# Patient Record
Sex: FEMALE | Race: WHITE | HISPANIC OR LATINO
[De-identification: names, ages, dates, MRNs, and addresses within clinical notes are randomized per-mention and may not be internally consistent; named-entity substitution may affect disease eponyms.]

---

## 2017-08-03 ENCOUNTER — APPOINTMENT (OUTPATIENT)
Dept: DERMATOLOGY | Facility: CLINIC | Age: 27
End: 2017-08-03
Payer: MEDICAID

## 2017-08-03 PROCEDURE — 99214 OFFICE O/P EST MOD 30 MIN: CPT

## 2018-05-26 ENCOUNTER — EMERGENCY (EMERGENCY)
Facility: HOSPITAL | Age: 28
LOS: 1 days | Discharge: HOME | End: 2018-05-26
Attending: EMERGENCY MEDICINE
Payer: MEDICAID

## 2018-05-26 VITALS
RESPIRATION RATE: 18 BRPM | WEIGHT: 138.01 LBS | SYSTOLIC BLOOD PRESSURE: 138 MMHG | TEMPERATURE: 98 F | DIASTOLIC BLOOD PRESSURE: 86 MMHG | OXYGEN SATURATION: 99 % | HEIGHT: 63 IN | HEART RATE: 76 BPM

## 2018-05-26 PROCEDURE — 99284 EMERGENCY DEPT VISIT MOD MDM: CPT | Mod: 25

## 2018-05-27 VITALS
OXYGEN SATURATION: 100 % | TEMPERATURE: 99 F | RESPIRATION RATE: 18 BRPM | SYSTOLIC BLOOD PRESSURE: 118 MMHG | HEART RATE: 68 BPM | DIASTOLIC BLOOD PRESSURE: 83 MMHG

## 2018-05-27 DIAGNOSIS — Z90.711 ACQUIRED ABSENCE OF UTERUS WITH REMAINING CERVICAL STUMP: Chronic | ICD-10-CM

## 2018-05-27 DIAGNOSIS — Z98.891 HISTORY OF UTERINE SCAR FROM PREVIOUS SURGERY: Chronic | ICD-10-CM

## 2018-05-27 LAB
ALBUMIN SERPL ELPH-MCNC: 4.4 G/DL — SIGNIFICANT CHANGE UP (ref 3.3–5.2)
ALP SERPL-CCNC: 53 U/L — SIGNIFICANT CHANGE UP (ref 40–120)
ALT FLD-CCNC: 14 U/L — SIGNIFICANT CHANGE UP
ANION GAP SERPL CALC-SCNC: 15 MMOL/L — SIGNIFICANT CHANGE UP (ref 5–17)
APPEARANCE UR: ABNORMAL
APTT BLD: 28.3 SEC — SIGNIFICANT CHANGE UP (ref 27.5–37.4)
AST SERPL-CCNC: 21 U/L — SIGNIFICANT CHANGE UP
BACTERIA # UR AUTO: ABNORMAL
BASOPHILS # BLD AUTO: 0 K/UL — SIGNIFICANT CHANGE UP (ref 0–0.2)
BASOPHILS NFR BLD AUTO: 0.2 % — SIGNIFICANT CHANGE UP (ref 0–2)
BILIRUB SERPL-MCNC: 0.3 MG/DL — LOW (ref 0.4–2)
BILIRUB UR-MCNC: NEGATIVE — SIGNIFICANT CHANGE UP
BUN SERPL-MCNC: 15 MG/DL — SIGNIFICANT CHANGE UP (ref 8–20)
CALCIUM SERPL-MCNC: 9.6 MG/DL — SIGNIFICANT CHANGE UP (ref 8.6–10.2)
CHLORIDE SERPL-SCNC: 103 MMOL/L — SIGNIFICANT CHANGE UP (ref 98–107)
CO2 SERPL-SCNC: 22 MMOL/L — SIGNIFICANT CHANGE UP (ref 22–29)
COLOR SPEC: YELLOW — SIGNIFICANT CHANGE UP
CREAT SERPL-MCNC: 0.9 MG/DL — SIGNIFICANT CHANGE UP (ref 0.5–1.3)
DIFF PNL FLD: NEGATIVE — SIGNIFICANT CHANGE UP
EOSINOPHIL # BLD AUTO: 0 K/UL — SIGNIFICANT CHANGE UP (ref 0–0.5)
EOSINOPHIL NFR BLD AUTO: 0.1 % — SIGNIFICANT CHANGE UP (ref 0–6)
EPI CELLS # UR: SIGNIFICANT CHANGE UP
GLUCOSE SERPL-MCNC: 133 MG/DL — HIGH (ref 70–115)
GLUCOSE UR QL: NEGATIVE MG/DL — SIGNIFICANT CHANGE UP
HCG UR QL: NEGATIVE — SIGNIFICANT CHANGE UP
HCT VFR BLD CALC: 42.5 % — SIGNIFICANT CHANGE UP (ref 37–47)
HGB BLD-MCNC: 13.9 G/DL — SIGNIFICANT CHANGE UP (ref 12–16)
INR BLD: 0.99 RATIO — SIGNIFICANT CHANGE UP (ref 0.88–1.16)
KETONES UR-MCNC: ABNORMAL
LEUKOCYTE ESTERASE UR-ACNC: ABNORMAL
LIDOCAIN IGE QN: 32 U/L — SIGNIFICANT CHANGE UP (ref 22–51)
LYMPHOCYTES # BLD AUTO: 1.4 K/UL — SIGNIFICANT CHANGE UP (ref 1–4.8)
LYMPHOCYTES # BLD AUTO: 10.4 % — LOW (ref 20–55)
MAGNESIUM SERPL-MCNC: 2 MG/DL — SIGNIFICANT CHANGE UP (ref 1.6–2.6)
MCHC RBC-ENTMCNC: 27.9 PG — SIGNIFICANT CHANGE UP (ref 27–31)
MCHC RBC-ENTMCNC: 32.7 G/DL — SIGNIFICANT CHANGE UP (ref 32–36)
MCV RBC AUTO: 85.2 FL — SIGNIFICANT CHANGE UP (ref 81–99)
MONOCYTES # BLD AUTO: 0.8 K/UL — SIGNIFICANT CHANGE UP (ref 0–0.8)
MONOCYTES NFR BLD AUTO: 6 % — SIGNIFICANT CHANGE UP (ref 3–10)
NEUTROPHILS # BLD AUTO: 10.8 K/UL — HIGH (ref 1.8–8)
NEUTROPHILS NFR BLD AUTO: 83.1 % — HIGH (ref 37–73)
NITRITE UR-MCNC: NEGATIVE — SIGNIFICANT CHANGE UP
PH UR: 7 — SIGNIFICANT CHANGE UP (ref 5–8)
PLATELET # BLD AUTO: 253 K/UL — SIGNIFICANT CHANGE UP (ref 150–400)
POTASSIUM SERPL-MCNC: 4 MMOL/L — SIGNIFICANT CHANGE UP (ref 3.5–5.3)
POTASSIUM SERPL-SCNC: 4 MMOL/L — SIGNIFICANT CHANGE UP (ref 3.5–5.3)
PROT SERPL-MCNC: 7.5 G/DL — SIGNIFICANT CHANGE UP (ref 6.6–8.7)
PROT UR-MCNC: 15 MG/DL
PROTHROM AB SERPL-ACNC: 10.9 SEC — SIGNIFICANT CHANGE UP (ref 9.8–12.7)
RBC # BLD: 4.99 M/UL — SIGNIFICANT CHANGE UP (ref 4.4–5.2)
RBC # FLD: 13.1 % — SIGNIFICANT CHANGE UP (ref 11–15.6)
RBC CASTS # UR COMP ASSIST: ABNORMAL /HPF (ref 0–4)
SODIUM SERPL-SCNC: 140 MMOL/L — SIGNIFICANT CHANGE UP (ref 135–145)
SP GR SPEC: 1.01 — SIGNIFICANT CHANGE UP (ref 1.01–1.02)
UROBILINOGEN FLD QL: 1 MG/DL
WBC # BLD: 12.9 K/UL — HIGH (ref 4.8–10.8)
WBC # FLD AUTO: 12.9 K/UL — HIGH (ref 4.8–10.8)
WBC UR QL: SIGNIFICANT CHANGE UP

## 2018-05-27 PROCEDURE — 76830 TRANSVAGINAL US NON-OB: CPT | Mod: 26,59

## 2018-05-27 PROCEDURE — 85730 THROMBOPLASTIN TIME PARTIAL: CPT

## 2018-05-27 PROCEDURE — 85027 COMPLETE CBC AUTOMATED: CPT

## 2018-05-27 PROCEDURE — 83690 ASSAY OF LIPASE: CPT

## 2018-05-27 PROCEDURE — 76856 US EXAM PELVIC COMPLETE: CPT

## 2018-05-27 PROCEDURE — 76856 US EXAM PELVIC COMPLETE: CPT | Mod: 26

## 2018-05-27 PROCEDURE — 96375 TX/PRO/DX INJ NEW DRUG ADDON: CPT

## 2018-05-27 PROCEDURE — 81025 URINE PREGNANCY TEST: CPT

## 2018-05-27 PROCEDURE — 99284 EMERGENCY DEPT VISIT MOD MDM: CPT | Mod: 25

## 2018-05-27 PROCEDURE — 74176 CT ABD & PELVIS W/O CONTRAST: CPT

## 2018-05-27 PROCEDURE — 80053 COMPREHEN METABOLIC PANEL: CPT

## 2018-05-27 PROCEDURE — 74176 CT ABD & PELVIS W/O CONTRAST: CPT | Mod: 26

## 2018-05-27 PROCEDURE — 96376 TX/PRO/DX INJ SAME DRUG ADON: CPT

## 2018-05-27 PROCEDURE — 83735 ASSAY OF MAGNESIUM: CPT

## 2018-05-27 PROCEDURE — 76830 TRANSVAGINAL US NON-OB: CPT

## 2018-05-27 PROCEDURE — 85610 PROTHROMBIN TIME: CPT

## 2018-05-27 PROCEDURE — 96374 THER/PROPH/DIAG INJ IV PUSH: CPT

## 2018-05-27 PROCEDURE — 81001 URINALYSIS AUTO W/SCOPE: CPT

## 2018-05-27 RX ORDER — KETOROLAC TROMETHAMINE 30 MG/ML
30 SYRINGE (ML) INJECTION ONCE
Qty: 0 | Refills: 0 | Status: DISCONTINUED | OUTPATIENT
Start: 2018-05-27 | End: 2018-05-27

## 2018-05-27 RX ORDER — KETOROLAC TROMETHAMINE 30 MG/ML
1 SYRINGE (ML) INJECTION
Qty: 20 | Refills: 0 | OUTPATIENT
Start: 2018-05-27 | End: 2018-05-31

## 2018-05-27 RX ORDER — TAMSULOSIN HYDROCHLORIDE 0.4 MG/1
0.4 CAPSULE ORAL ONCE
Qty: 0 | Refills: 0 | Status: COMPLETED | OUTPATIENT
Start: 2018-05-27 | End: 2018-05-27

## 2018-05-27 RX ORDER — SODIUM CHLORIDE 9 MG/ML
1000 INJECTION INTRAMUSCULAR; INTRAVENOUS; SUBCUTANEOUS
Qty: 0 | Refills: 0 | Status: COMPLETED | OUTPATIENT
Start: 2018-05-27 | End: 2018-05-27

## 2018-05-27 RX ORDER — TAMSULOSIN HYDROCHLORIDE 0.4 MG/1
1 CAPSULE ORAL
Qty: 15 | Refills: 0 | OUTPATIENT
Start: 2018-05-27 | End: 2018-06-10

## 2018-05-27 RX ORDER — ONDANSETRON 8 MG/1
4 TABLET, FILM COATED ORAL ONCE
Qty: 0 | Refills: 0 | Status: COMPLETED | OUTPATIENT
Start: 2018-05-27 | End: 2018-05-27

## 2018-05-27 RX ORDER — OXYCODONE HYDROCHLORIDE 5 MG/1
5 TABLET ORAL ONCE
Qty: 0 | Refills: 0 | Status: DISCONTINUED | OUTPATIENT
Start: 2018-05-27 | End: 2018-05-27

## 2018-05-27 RX ADMIN — SODIUM CHLORIDE 999 MILLILITER(S): 9 INJECTION INTRAMUSCULAR; INTRAVENOUS; SUBCUTANEOUS at 02:45

## 2018-05-27 RX ADMIN — ONDANSETRON 4 MILLIGRAM(S): 8 TABLET, FILM COATED ORAL at 02:46

## 2018-05-27 RX ADMIN — Medication 30 MILLIGRAM(S): at 08:56

## 2018-05-27 RX ADMIN — TAMSULOSIN HYDROCHLORIDE 0.4 MILLIGRAM(S): 0.4 CAPSULE ORAL at 08:56

## 2018-05-27 RX ADMIN — Medication 30 MILLIGRAM(S): at 02:46

## 2018-05-27 RX ADMIN — OXYCODONE HYDROCHLORIDE 5 MILLIGRAM(S): 5 TABLET ORAL at 03:40

## 2018-05-27 NOTE — ED PROVIDER NOTE - PROGRESS NOTE DETAILS
results reviewed, expaliend dx to patient  requesting for pain meds, ordered  signed out to am ed attending signed out Dr Benjamin  for evaluation atter pain meds; pt improved dc on toradol, percocet, and flomax; referral to Dr Sanchez to urology

## 2018-05-27 NOTE — ED PROVIDER NOTE - NS ED ROS FT
no weight change, no fever or chills  no recent travel, no recent abox, no sick contacts  no recent change in medications  no rash, no bruises  no visual changes no eye discharge  no cough cold or congestion,   no sob, no chest pain  no orthopnea, no pnd  +abd pain +nausea no v/d  no hematuria, no change in urinary habits  no joint pain, no deformity  no headache, no paresthesia   +Sexually active no STD/STI

## 2018-05-27 NOTE — ED PROVIDER NOTE - PHYSICAL EXAMINATION
Constitutional: uncomfortable  talking in short sentences due to pain   Head: NC/AT, no swelling  Eyes: EOMI, no swelling  Mouth: mm moist  Neck: supple, trachea is midline  Chest: Bilateral air entry, symmetrical chest expansion, no distress  Heart: S1 S2 distant  Abdomen: RLQ tenderness with localized guarding and rebound   Musc/Skel: extremities with no swelling, no deformity, no spine tenderness, distal pulses present  Neuro: A&Ox3, no focal deficits

## 2018-05-27 NOTE — ED PROVIDER NOTE - OBJECTIVE STATEMENT
27 y/o F pt with hx of ovarian cyst presents to ED c/o acute RLQ abdominal pain associated with nausea. Pain described as a sharp sensation which worsening with movement. Pt is sexually active, no hx of STD/STI. No vomiting, diarrhea, fevers. No other complaints at this time.

## 2018-05-27 NOTE — ED ADULT NURSE NOTE - CHPI ED SYMPTOMS NEG
no burning urination/no vomiting/no abdominal distension/no chills/no blood in stool/no diarrhea/no dysuria/no fever/no hematuria

## 2018-05-27 NOTE — ED ADULT NURSE REASSESSMENT NOTE - NS ED NURSE REASSESS COMMENT FT1
pt s/p reevaluation cleared to be discharged home. pt instructed to follow up with pmd to take meds as prescribed, hydrate and return to ed for worsening symptoms. pt stated her understanding

## 2018-05-27 NOTE — ED ADULT NURSE NOTE - OBJECTIVE STATEMENT
patient appears to be very unvomfortable c/o pain to right side abdominal. patient states pain started about 12 mid. deneis any radiation

## 2018-06-04 ENCOUNTER — APPOINTMENT (OUTPATIENT)
Dept: UROLOGY | Facility: CLINIC | Age: 28
End: 2018-06-04
Payer: MEDICAID

## 2018-06-04 VITALS
SYSTOLIC BLOOD PRESSURE: 122 MMHG | DIASTOLIC BLOOD PRESSURE: 83 MMHG | BODY MASS INDEX: 24.75 KG/M2 | HEIGHT: 64 IN | HEART RATE: 89 BPM | TEMPERATURE: 98.6 F | WEIGHT: 145 LBS

## 2018-06-04 DIAGNOSIS — N20.1 CALCULUS OF URETER: ICD-10-CM

## 2018-06-04 PROCEDURE — 99203 OFFICE O/P NEW LOW 30 MIN: CPT

## 2018-06-04 NOTE — LETTER BODY
[Dear  ___] : Dear  [unfilled], [Courtesy Letter:] : I had the pleasure of seeing your patient, [unfilled], in my office today. [Sincerely,] : Sincerely, [FreeTextEntry3] : Ed\par \par Steve Sanchez MD\par [facility]\par

## 2018-06-04 NOTE — HISTORY OF PRESENT ILLNESS
[FreeTextEntry1] : Patient was seen in the ER about a week ago. She had a CT scan. was told she had a small kidney stone.\par Her symptoms were severe right lower quadrant pain radiating to the back. Had nausea. no vominting.  no fevers or chills. \par Currently pain is better with episodic increases in pain.

## 2018-06-04 NOTE — ASSESSMENT
[FreeTextEntry1] : Impression:\par \par renal colic\par right ureteral stone\par \par Plan:\par \par continue tamsulosin\par KUB\par follow up in one week

## 2018-06-04 NOTE — REVIEW OF SYSTEMS
[Recent Weight Gain (___ Lbs)] : recent [unfilled] ~Ulb weight gain [Abdominal Pain] : abdominal pain [Vomiting] : vomiting [Constipation] : constipation [Hot Flashes] : hot flashes [Easy Bruising] : a tendency for easy bruising [Negative] : Psychiatric

## 2018-06-11 ENCOUNTER — APPOINTMENT (OUTPATIENT)
Dept: UROLOGY | Facility: CLINIC | Age: 28
End: 2018-06-11
Payer: MEDICAID

## 2018-06-11 VITALS
HEIGHT: 64 IN | SYSTOLIC BLOOD PRESSURE: 107 MMHG | TEMPERATURE: 98.4 F | WEIGHT: 145 LBS | BODY MASS INDEX: 24.75 KG/M2 | HEART RATE: 83 BPM | DIASTOLIC BLOOD PRESSURE: 72 MMHG

## 2018-06-11 PROCEDURE — 99213 OFFICE O/P EST LOW 20 MIN: CPT

## 2018-06-11 NOTE — HISTORY OF PRESENT ILLNESS
[FreeTextEntry1] : Patient with known right ureteral stone. has been on tamsulosin.  no fever, chills, nausea or vomiting.

## 2018-06-11 NOTE — LETTER BODY
[Dear  ___] : Dear  [unfilled], [Courtesy Letter:] : I had the pleasure of seeing your patient, [unfilled], in my office today. [Please see my note below.] : Please see my note below. [Sincerely,] : Sincerely, [FreeTextEntry3] : Ed\par \par Steve Sanchez MD\par [facility]\par

## 2018-06-11 NOTE — ASSESSMENT
[FreeTextEntry1] : KUB reviewed; negative\par \par Impression:\par distal right ureteral stone\par \par Plan:\par \par follow up 3 months with KUB\par

## 2018-06-11 NOTE — PHYSICAL EXAM
[General Appearance - Well Developed] : well developed [General Appearance - Well Nourished] : well nourished [Normal Appearance] : normal appearance [Well Groomed] : well groomed [General Appearance - In No Acute Distress] : no acute distress [Urinary Bladder Findings] : the bladder was normal on palpation [] : no respiratory distress [Respiration, Rhythm And Depth] : normal respiratory rhythm and effort [Exaggerated Use Of Accessory Muscles For Inspiration] : no accessory muscle use [Oriented To Time, Place, And Person] : oriented to person, place, and time [Affect] : the affect was normal [Mood] : the mood was normal [Not Anxious] : not anxious

## 2018-09-11 ENCOUNTER — APPOINTMENT (OUTPATIENT)
Dept: UROLOGY | Facility: CLINIC | Age: 28
End: 2018-09-11

## 2019-12-06 ENCOUNTER — ASOB RESULT (OUTPATIENT)
Age: 29
End: 2019-12-06

## 2019-12-06 ENCOUNTER — APPOINTMENT (OUTPATIENT)
Dept: ANTEPARTUM | Facility: CLINIC | Age: 29
End: 2019-12-06
Payer: MEDICAID

## 2019-12-06 PROCEDURE — 76856 US EXAM PELVIC COMPLETE: CPT | Mod: 59

## 2019-12-06 PROCEDURE — 76830 TRANSVAGINAL US NON-OB: CPT

## 2020-06-05 ENCOUNTER — APPOINTMENT (OUTPATIENT)
Dept: ANTEPARTUM | Facility: CLINIC | Age: 30
End: 2020-06-05
Payer: MEDICAID

## 2020-06-05 ENCOUNTER — ASOB RESULT (OUTPATIENT)
Age: 30
End: 2020-06-05

## 2020-06-05 PROCEDURE — 76830 TRANSVAGINAL US NON-OB: CPT

## 2020-06-05 PROCEDURE — 76856 US EXAM PELVIC COMPLETE: CPT | Mod: 59

## 2020-12-14 ENCOUNTER — ASOB RESULT (OUTPATIENT)
Age: 30
End: 2020-12-14

## 2020-12-14 ENCOUNTER — APPOINTMENT (OUTPATIENT)
Dept: ANTEPARTUM | Facility: CLINIC | Age: 30
End: 2020-12-14
Payer: MEDICAID

## 2020-12-14 PROCEDURE — 76856 US EXAM PELVIC COMPLETE: CPT | Mod: 59

## 2020-12-14 PROCEDURE — 76830 TRANSVAGINAL US NON-OB: CPT

## 2020-12-14 PROCEDURE — 99072 ADDL SUPL MATRL&STAF TM PHE: CPT

## 2023-04-07 PROBLEM — Z00.00 ENCOUNTER FOR PREVENTIVE HEALTH EXAMINATION: Noted: 2023-04-07

## 2023-04-19 ENCOUNTER — APPOINTMENT (OUTPATIENT)
Dept: UROLOGY | Facility: CLINIC | Age: 33
End: 2023-04-19
Payer: MEDICAID

## 2023-04-19 ENCOUNTER — TRANSCRIPTION ENCOUNTER (OUTPATIENT)
Age: 33
End: 2023-04-19

## 2023-04-19 VITALS
DIASTOLIC BLOOD PRESSURE: 76 MMHG | HEART RATE: 82 BPM | RESPIRATION RATE: 17 BRPM | OXYGEN SATURATION: 98 % | SYSTOLIC BLOOD PRESSURE: 111 MMHG

## 2023-04-19 DIAGNOSIS — Z80.3 FAMILY HISTORY OF MALIGNANT NEOPLASM OF BREAST: ICD-10-CM

## 2023-04-19 DIAGNOSIS — Z82.49 FAMILY HISTORY OF ISCHEMIC HEART DISEASE AND OTHER DISEASES OF THE CIRCULATORY SYSTEM: ICD-10-CM

## 2023-04-19 DIAGNOSIS — Z78.9 OTHER SPECIFIED HEALTH STATUS: ICD-10-CM

## 2023-04-19 LAB
BILIRUB UR QL STRIP: NORMAL
CLARITY UR: CLEAR
COLLECTION METHOD: NORMAL
GLUCOSE UR-MCNC: NORMAL
HCG UR QL: 0.2 EU/DL
HGB UR QL STRIP.AUTO: ABNORMAL
KETONES UR-MCNC: NORMAL
LEUKOCYTE ESTERASE UR QL STRIP: NORMAL
NITRITE UR QL STRIP: NORMAL
PH UR STRIP: 5.5
PROT UR STRIP-MCNC: NORMAL
SP GR UR STRIP: 1.02

## 2023-04-19 PROCEDURE — 81003 URINALYSIS AUTO W/O SCOPE: CPT | Mod: QW

## 2023-04-19 PROCEDURE — 99204 OFFICE O/P NEW MOD 45 MIN: CPT

## 2023-04-19 NOTE — HISTORY OF PRESENT ILLNESS
[FreeTextEntry1] : Patient here for recurrent UTI.  Her typical symptoms are pelvic pressure, dysuria, frequency. no hematuria. She has had positive cultures.  Gets UTIs every three months or so. Empties her bladder after intercourse.

## 2023-04-19 NOTE — ASSESSMENT
[FreeTextEntry1] : Impression:\par \par recurrent UTI\par \par Plan:\par \par renal ultrasound\par push fluids\par prophylactic nitrofuantoin.\par \par follow up one month

## 2023-04-25 ENCOUNTER — APPOINTMENT (OUTPATIENT)
Dept: ULTRASOUND IMAGING | Facility: CLINIC | Age: 33
End: 2023-04-25
Payer: MEDICAID

## 2023-04-25 ENCOUNTER — OUTPATIENT (OUTPATIENT)
Dept: OUTPATIENT SERVICES | Facility: HOSPITAL | Age: 33
LOS: 1 days | End: 2023-04-25
Payer: MEDICAID

## 2023-04-25 ENCOUNTER — TRANSCRIPTION ENCOUNTER (OUTPATIENT)
Age: 33
End: 2023-04-25

## 2023-04-25 ENCOUNTER — RESULT REVIEW (OUTPATIENT)
Age: 33
End: 2023-04-25

## 2023-04-25 ENCOUNTER — APPOINTMENT (OUTPATIENT)
Dept: MAMMOGRAPHY | Facility: CLINIC | Age: 33
End: 2023-04-25
Payer: MEDICAID

## 2023-04-25 DIAGNOSIS — N39.0 URINARY TRACT INFECTION, SITE NOT SPECIFIED: ICD-10-CM

## 2023-04-25 DIAGNOSIS — Z90.711 ACQUIRED ABSENCE OF UTERUS WITH REMAINING CERVICAL STUMP: Chronic | ICD-10-CM

## 2023-04-25 PROCEDURE — 76775 US EXAM ABDO BACK WALL LIM: CPT | Mod: 26

## 2023-04-25 PROCEDURE — 77067 SCR MAMMO BI INCL CAD: CPT | Mod: 26

## 2023-04-25 PROCEDURE — 76641 ULTRASOUND BREAST COMPLETE: CPT | Mod: 26,50

## 2023-04-25 PROCEDURE — 76775 US EXAM ABDO BACK WALL LIM: CPT

## 2023-04-25 PROCEDURE — 77063 BREAST TOMOSYNTHESIS BI: CPT | Mod: 26

## 2023-04-25 PROCEDURE — 76641 ULTRASOUND BREAST COMPLETE: CPT

## 2023-04-25 PROCEDURE — 77067 SCR MAMMO BI INCL CAD: CPT

## 2023-04-25 PROCEDURE — 77063 BREAST TOMOSYNTHESIS BI: CPT

## 2023-05-22 ENCOUNTER — APPOINTMENT (OUTPATIENT)
Dept: UROLOGY | Facility: CLINIC | Age: 33
End: 2023-05-22
Payer: MEDICAID

## 2023-05-22 VITALS — SYSTOLIC BLOOD PRESSURE: 115 MMHG | DIASTOLIC BLOOD PRESSURE: 80 MMHG

## 2023-05-22 DIAGNOSIS — N39.0 URINARY TRACT INFECTION, SITE NOT SPECIFIED: ICD-10-CM

## 2023-05-22 PROCEDURE — 99213 OFFICE O/P EST LOW 20 MIN: CPT

## 2023-05-22 RX ORDER — NITROFURANTOIN MACROCRYSTALS 100 MG/1
100 CAPSULE ORAL
Qty: 30 | Refills: 3 | Status: ACTIVE | COMMUNITY
Start: 2023-04-19 | End: 1900-01-01

## 2023-05-22 NOTE — PHYSICAL EXAM
[General Appearance - Well Developed] : well developed [General Appearance - Well Nourished] : well nourished [Normal Appearance] : normal appearance [Well Groomed] : well groomed [General Appearance - In No Acute Distress] : no acute distress [Abdomen Soft] : soft [Abdomen Tenderness] : non-tender [Costovertebral Angle Tenderness] : no ~M costovertebral angle tenderness [Edema] : no peripheral edema [] : no respiratory distress [Respiration, Rhythm And Depth] : normal respiratory rhythm and effort [Exaggerated Use Of Accessory Muscles For Inspiration] : no accessory muscle use [Oriented To Time, Place, And Person] : oriented to person, place, and time [Affect] : the affect was normal [Mood] : the mood was normal [Not Anxious] : not anxious [Normal Station and Gait] : the gait and station were normal for the patient's age Metronidazole Counseling:  I discussed with the patient the risks of metronidazole including but not limited to seizures, nausea/vomiting, a metallic taste in the mouth, nausea/vomiting and severe allergy.

## 2023-05-22 NOTE — ASSESSMENT
[FreeTextEntry1] : Impression:\par \par normal renal ultrasound\par recurrent UTI, asymptomatic\par \par Plan:\par \par start nitrofurantoin\par follow up in one month with UA

## 2023-05-22 NOTE — LETTER BODY
[Dear  ___] : Dear  [unfilled], [Courtesy Letter:] : I had the pleasure of seeing your patient, [unfilled], in my office today. [Please see my note below.] : Please see my note below. [Sincerely,] : Sincerely, [FreeTextEntry3] : Ed\par \par Steve Sanchez MD\par [facility]\par  of Urology\par [employer] [facility]\par

## 2023-05-22 NOTE — HISTORY OF PRESENT ILLNESS
[FreeTextEntry1] : The patienthas been on antibiotics for oral surgery so she did not start the prophylactic nitrofurantoin.

## 2023-05-28 ENCOUNTER — EMERGENCY (EMERGENCY)
Facility: HOSPITAL | Age: 33
LOS: 1 days | End: 2023-05-28
Attending: EMERGENCY MEDICINE
Payer: MEDICAID

## 2023-05-28 VITALS
SYSTOLIC BLOOD PRESSURE: 119 MMHG | TEMPERATURE: 99 F | RESPIRATION RATE: 18 BRPM | DIASTOLIC BLOOD PRESSURE: 83 MMHG | OXYGEN SATURATION: 97 % | HEART RATE: 101 BPM | WEIGHT: 163.36 LBS

## 2023-05-28 DIAGNOSIS — Z90.711 ACQUIRED ABSENCE OF UTERUS WITH REMAINING CERVICAL STUMP: Chronic | ICD-10-CM

## 2023-05-28 DIAGNOSIS — Z98.891 HISTORY OF UTERINE SCAR FROM PREVIOUS SURGERY: Chronic | ICD-10-CM

## 2023-05-28 PROCEDURE — 99284 EMERGENCY DEPT VISIT MOD MDM: CPT

## 2023-05-28 PROCEDURE — 99283 EMERGENCY DEPT VISIT LOW MDM: CPT

## 2023-05-28 RX ORDER — FAMOTIDINE 10 MG/ML
40 INJECTION INTRAVENOUS ONCE
Refills: 0 | Status: COMPLETED | OUTPATIENT
Start: 2023-05-28 | End: 2023-05-28

## 2023-05-28 RX ORDER — DIPHENHYDRAMINE HCL 50 MG
1 CAPSULE ORAL
Qty: 28 | Refills: 0
Start: 2023-05-28 | End: 2023-06-03

## 2023-05-28 RX ORDER — LORATADINE 10 MG/1
10 TABLET ORAL DAILY
Refills: 0 | Status: DISCONTINUED | OUTPATIENT
Start: 2023-05-28 | End: 2023-06-04

## 2023-05-28 RX ORDER — FAMOTIDINE 10 MG/ML
1 INJECTION INTRAVENOUS
Qty: 14 | Refills: 0
Start: 2023-05-28 | End: 2023-06-03

## 2023-05-28 RX ADMIN — FAMOTIDINE 40 MILLIGRAM(S): 10 INJECTION INTRAVENOUS at 04:25

## 2023-05-28 RX ADMIN — Medication 60 MILLIGRAM(S): at 04:25

## 2023-05-28 RX ADMIN — LORATADINE 10 MILLIGRAM(S): 10 TABLET ORAL at 04:25

## 2023-05-28 NOTE — ED PROVIDER NOTE - PHYSICAL EXAMINATION
Const: AOX3 nontoxic appearing, no apparent respiratory or physical distress. Stable gait   HEENT: NC/AT. Moist mucous membranes. throat clear uvula midline - no facial or lips swollen - able to peak full sentence   Eyes: YAIMA. EOMI  Neck: Soft and supple. Full ROM without pain.  Cardiac: Regular rate and regular rhythm. +S1/S2.. No LE edema.  Resp: Speaking in full sentences. No evidence of respiratory distress. No wheezes, rales or rhonchi. No adventitious breath sounds   Abd: Soft, non-tender, non-distended.  Skin: scattered patchy hives with define border on the ant chest  on lower back and lower leg - blanching easy - with excoriation marks noted   Neuro: Awake, alert & oriented x 3. Moves all extremities symmetrically.

## 2023-05-28 NOTE — ED PROVIDER NOTE - CLINICAL SUMMARY MEDICAL DECISION MAKING FREE TEXT BOX
33 y<o female PMh of UTI presents in ER and c.o allogenic reaction and hives on her body as she woke up today in 2am . she woke up and took 2 OTC benadryl did not help much , states she had f.u with urology and start nicolasa microbid once daily that she took one dose 9 pm last night for chronic UTI PPX . she never had that one before - she use to be previously on cipro . states her body arm / chest legs and back wash burning and itching . denies any hx of allergic reaction . she did not eat anything out of ordinary. no new meal or some one else at home. she denies any pregnancy .she denies any difficulty breathing ,or feeling throat is closing. denies any uti symptoms   strop the macrobid   pepcid 40mg , [id number]  and prednione 60mg

## 2023-05-28 NOTE — ED PROVIDER NOTE - CARE PROVIDER_API CALL
Vahe Cesar J  Allergy and Immunology  [address] [place] [address]  Phone: [phone]  Fax: [phone]  Follow Up Time:

## 2023-05-28 NOTE — ED PROVIDER NOTE - OBJECTIVE STATEMENT
33 y<o female PMh of UTI presents in ER and c.o allogenic reaction and hives on her body as she woke up today in 2am . she woke up and took 2 OTC benadryl did not help much , states she had f.u with urology and start nicolasa microbid once daily that she took one dose 9 pm last night for chronic UTI PPX . she never had that one before - she use to be previously on cipro . states her body arm / chest legs and back wash burning and itching . denies any hx of allergic reaction . she did not eat anything out of ordinary. no new meal or some one else at home. she denies any pregnancy .she denies any difficulty breathing ,or feeling throat is closing 33 y<o female PMh of UTI presents in ER and c.o allogenic reaction and hives on her body as she woke up today in 2am . she woke up and took 2 OTC benadryl did not help much , states she had f.u with urology and start nicolasa microbid once daily that she took one dose 9 pm last night for chronic UTI PPX . she never had that one before - she use to be previously on cipro . states her body arm / chest legs and back wash burning and itching . denies any hx of allergic reaction . she did not eat anything out of ordinary. no new meal or some one else at home. she denies any pregnancy .she denies any difficulty breathing ,or feeling throat is closing. denies any uti symptoms 33 y<o female PMh of UTI presents in ER and c.o allogenic reaction and hives on her body as she woke up today in 2am . she woke up and took 2 OTC benadryl did not help much , states she had f.u with urology and start her on microbid once daily that she took one dose 9 pm last night for chronic UTI PPX  . she never had that Macrobid before - she use to be previously on cipro . states her body scalp arm / chest legs and back was burning and itching . denies any hx of allergic reaction . she did not eat anything out of ordinary or taking any other medication beside microbid . No new meal or some one else at same symptoms home. she denies any pregnancy .she denies any difficulty breathing ,or feeling throat is closing. denies any uti symptoms

## 2023-05-28 NOTE — ED ADULT TRIAGE NOTE - CHIEF COMPLAINT QUOTE
pt report woke up with hives, skin burn, itchiness. No reported allergies. Took benadryl at 2 am. Pt is on antibiotics nitrofurantonin took at 9 pm.

## 2023-05-28 NOTE — ED PROVIDER NOTE - NSFOLLOWUPINSTRUCTIONS_ED_ALL_ED_FT
Stopped the antibiotic   continue medication as prescribed  follow up with your urology to change antibiotic possibly   follow with allergy physician   Hives    Hives (urticaria) are itchy, red, swollen areas on the skin. Hives can appear on any part of the body. Hives often fade within 24 hours (acute hives). Sometimes, new hives appear after old ones fade and the cycle can continue for several days or weeks (chronic hives). Hives do not spread from person to person (are notcontagious).    Hives come from the body's reaction to something a person is allergic to (allergen), something that causes irritation, or various other triggers. When a person is exposed to a trigger, his or her body releases a chemical (histamine) that causes redness, itching, and swelling. Hives can appear right after exposure to a trigger or hours later.    What are the causes?  This condition may be caused by:    Allergies to foods or ingredients.  Insect bites or stings.  Exposure to pollen or pets.  Contact with latex or chemicals.  Spending time in sunlight, heat, or cold (exposure).  Exercise.  Stress.  Certain medicines.    You can also get hives from other medical conditions and treatments, such as:    Viruses, including the common cold.  Bacterial infections, such as urinary tract infections and strep throat.  Certain medicines.  Allergy shots.  Blood transfusions.    Sometimes, the cause of this condition is not known (idiopathic hives).    What increases the risk?  You are more likely to develop this condition if you:    Are a woman.  Have food allergies, especially to citrus fruits, milk, eggs, peanuts, tree nuts, or shellfish.  Are allergic to:    Medicines.  Latex.  Insects.  Animals.  Pollen.    What are the signs or symptoms?     Common symptoms of this condition include raised, itchy, red or white bumps or patches on your skin. These areas may:    Become large and swollen (welts).  Change in shape and location, quickly and repeatedly.  Be separate hives or connect over a large area of skin.  Sting or become painful.  Turn white when pressed in the center (tara).    In severe cases, your hands, feet, and face may also become swollen. This may occur if hives develop deeper in your skin.    How is this diagnosed?  This condition may be diagnosed by your symptoms, medical history, and physical exam.    Your skin, urine, or blood may be tested to find out what is causing your hives and to rule out other health issues.  Your health care provider may also remove a small sample of skin from the affected area and examine it under a microscope (biopsy).    How is this treated?  Treatment for this condition depends on the cause and severity of your symptoms. Your health care provider may recommend using cool, wet cloths (cool compresses) or taking cool showers to relieve itching. Treatment may include:    Medicines that help:    Relieve itching (antihistamines).  Reduce swelling (corticosteroids).  Treat infection (antibiotics).  An injectable medicine (omalizumab). Your health care provider may prescribe this if you have chronic idiopathic hives and you continue to have symptoms even after treatment with antihistamines.    Severe cases may require an emergency injection of adrenaline (epinephrine) to prevent a life-threatening allergic reaction (anaphylaxis).    Follow these instructions at home:      Medicines    Take and apply over-the-counter and prescription medicines only as told by your health care provider.  If you were prescribed an antibiotic medicine, take it as told by your health care provider. Do not stop using the antibiotic even if you start to feel better.        Skin care    Apply cool compresses to the affected areas.  Do not scratch or rub your skin.        General instructions    Do not take hot showers or baths. This can make itching worse.  Do not wear tight-fitting clothing.  Use sunscreen and wear protective clothing when you are outside.  Avoid any substances that cause your hives. Keep a journal to help track what causes your hives. Write down:    What medicines you take.  What you eat and drink.  What products you use on your skin.  Keep all follow-up visits as told by your health care provider. This is important.    Contact a health care provider if:  Your symptoms are not controlled with medicine.  Your joints are painful or swollen.    Get help right away if:  You have a fever.  You have pain in your abdomen.  Your tongue or lips are swollen.  Your eyelids are swollen.  Your chest or throat feels tight.  You have trouble breathing or swallowing.    These symptoms may represent a serious problem that is an emergency. Do not wait to see if the symptoms will go away. Get medical help right away. Call your local emergency services (911 in the [place] Do not drive yourself to the hospital.    Summary  Hives (urticaria) are itchy, red, swollen areas on your skin. Hives come from the body's reaction to something a person is allergic to (allergen), something that causes irritation, or various other triggers.  Treatment for this condition depends on the cause and severity of your symptoms.  Avoid any substances that cause your hives. Keep a journal to help track what causes your hives.  Take and apply over-the-counter and prescription medicines only as told by your health care provider.  Keep all follow-up visits as told by your health care provider. This is important.    ADDITIONAL NOTES AND INSTRUCTIONS    Please follow up with your Primary MD in 24-48 hr.  Seek immediate medical care for any new/worsening signs or symptoms. Stopped the antibiotic that is prescribed by urology   continue medication as prescribed  follow up with your urology to change antibiotic possibly   follow with allergy physician for further evaluation   Allergic Reaction  Hives    Hives (urticaria) are itchy, red, swollen areas on the skin. Hives can appear on any part of the body. Hives often fade within 24 hours (acute hives). Sometimes, new hives appear after old ones fade and the cycle can continue for several days or weeks (chronic hives). Hives do not spread from person to person (are notcontagious).    Hives come from the body's reaction to something a person is allergic to (allergen), something that causes irritation, or various other triggers. When a person is exposed to a trigger, his or her body releases a chemical (histamine) that causes redness, itching, and swelling. Hives can appear right after exposure to a trigger or hours later.    What are the causes?  This condition may be caused by:    Allergies to foods or ingredients.  Insect bites or stings.  Exposure to pollen or pets.  Contact with latex or chemicals.  Spending time in sunlight, heat, or cold (exposure).  Exercise.  Stress.  Certain medicines.    You can also get hives from other medical conditions and treatments, such as:    Viruses, including the common cold.  Bacterial infections, such as urinary tract infections and strep throat.  Certain medicines.  Allergy shots.  Blood transfusions.    Sometimes, the cause of this condition is not known (idiopathic hives).    What increases the risk?  You are more likely to develop this condition if you:    Are a woman.  Have food allergies, especially to citrus fruits, milk, eggs, peanuts, tree nuts, or shellfish.  Are allergic to:    Medicines.  Latex.  Insects.  Animals.  Pollen.    What are the signs or symptoms?     Common symptoms of this condition include raised, itchy, red or white bumps or patches on your skin. These areas may:    Become large and swollen (welts).  Change in shape and location, quickly and repeatedly.  Be separate hives or connect over a large area of skin.  Sting or become painful.  Turn white when pressed in the center (tara).    In severe cases, your hands, feet, and face may also become swollen. This may occur if hives develop deeper in your skin.    How is this diagnosed?  This condition may be diagnosed by your symptoms, medical history, and physical exam.    Your skin, urine, or blood may be tested to find out what is causing your hives and to rule out other health issues.  Your health care provider may also remove a small sample of skin from the affected area and examine it under a microscope (biopsy).    How is this treated?  Treatment for this condition depends on the cause and severity of your symptoms. Your health care provider may recommend using cool, wet cloths (cool compresses) or taking cool showers to relieve itching. Treatment may include:    Medicines that help:    Relieve itching (antihistamines).  Reduce swelling (corticosteroids).  Treat infection (antibiotics).  An injectable medicine (omalizumab). Your health care provider may prescribe this if you have chronic idiopathic hives and you continue to have symptoms even after treatment with antihistamines.    Severe cases may require an emergency injection of adrenaline (epinephrine) to prevent a life-threatening allergic reaction (anaphylaxis).    Follow these instructions at home:      Medicines    Take and apply over-the-counter and prescription medicines only as told by your health care provider.  If you were prescribed an antibiotic medicine, take it as told by your health care provider. Do not stop using the antibiotic even if you start to feel better.        Skin care    Apply cool compresses to the affected areas.  Do not scratch or rub your skin.        General instructions    Do not take hot showers or baths. This can make itching worse.  Do not wear tight-fitting clothing.  Use sunscreen and wear protective clothing when you are outside.  Avoid any substances that cause your hives. Keep a journal to help track what causes your hives. Write down:    What medicines you take.  What you eat and drink.  What products you use on your skin.  Keep all follow-up visits as told by your health care provider. This is important.    Contact a health care provider if:  Your symptoms are not controlled with medicine.  Your joints are painful or swollen.    Get help right away if:  You have a fever.  You have pain in your abdomen.  Your tongue or lips are swollen.  Your eyelids are swollen.  Your chest or throat feels tight.  You have trouble breathing or swallowing.    These symptoms may represent a serious problem that is an emergency. Do not wait to see if the symptoms will go away. Get medical help right away. Call your local emergency services (911 in the [place] Do not drive yourself to the hospital.    Summary  Hives (urticaria) are itchy, red, swollen areas on your skin. Hives come from the body's reaction to something a person is allergic to (allergen), something that causes irritation, or various other triggers.  Treatment for this condition depends on the cause and severity of your symptoms.  Avoid any substances that cause your hives. Keep a journal to help track what causes your hives.  Take and apply over-the-counter and prescription medicines only as told by your health care provider.  Keep all follow-up visits as told by your health care provider. This is important.    ADDITIONAL NOTES AND INSTRUCTIONS    Please follow up with your Primary MD in 24-48 hr.  Seek immediate medical care for any new/worsening signs or symptoms.

## 2023-05-28 NOTE — ED PROVIDER NOTE - PROGRESS NOTE DETAILS
rash and itchiness are subsided after medication - no hx of allergy reaction   will dc on med - pt is been told stp taking microbid

## 2023-05-28 NOTE — ED PROVIDER NOTE - NS ED ATTENDING STATEMENT MOD
This was a shared visit with the CELY. I reviewed and verified the documentation and independently performed the documented:

## 2023-05-28 NOTE — ED PROVIDER NOTE - PATIENT PORTAL LINK FT
You can access the FollowMyHealth Patient Portal offered by [facility] by registering at the following website: [email or web address]. By joining [facility]’s FollowMyHealth portal, you will also be able to view your health information using other applications (apps) compatible with our system.

## 2023-05-28 NOTE — ED ADULT NURSE NOTE - OBJECTIVE STATEMENT
pt woke up to having itchiness all over and hives. breathing even and unlabored. meds given by Sherly HOBBS. pt reports taking prescribed abx.

## 2023-05-28 NOTE — ED ADULT NURSE NOTE - NSFALLUNIVINTERV_ED_ALL_ED
Bed/Stretcher in lowest position, wheels locked, appropriate side rails in place/Call bell, personal items and telephone in reach/Instruct patient to call for assistance before getting out of bed/chair/stretcher/Non-slip footwear applied when patient is off stretcher/[place] to call system/Physically safe environment - no spills, clutter or unnecessary equipment/Purposeful proactive rounding/Room/bathroom lighting operational, light cord in reach

## 2023-05-28 NOTE — ED ADULT NURSE NOTE - NSHOSCREENINGQ1_ED_ALL_ED
No

## 2023-07-07 ENCOUNTER — OFFICE (OUTPATIENT)
Dept: URBAN - METROPOLITAN AREA CLINIC 94 | Facility: CLINIC | Age: 33
Setting detail: OPHTHALMOLOGY
End: 2023-07-07
Payer: COMMERCIAL

## 2023-07-07 ENCOUNTER — RX ONLY (RX ONLY)
Age: 33
End: 2023-07-07

## 2023-07-07 DIAGNOSIS — H47.233: ICD-10-CM

## 2023-07-07 DIAGNOSIS — H11.441: ICD-10-CM

## 2023-07-07 DIAGNOSIS — H16.223: ICD-10-CM

## 2023-07-07 PROCEDURE — 92004 COMPRE OPH EXAM NEW PT 1/>: CPT | Performed by: PHYSICIAN ASSISTANT

## 2023-07-07 PROCEDURE — 92250 FUNDUS PHOTOGRAPHY W/I&R: CPT | Performed by: PHYSICIAN ASSISTANT

## 2023-07-07 ASSESSMENT — SPHEQUIV_DERIVED
OD_SPHEQUIV: -0.25
OS_SPHEQUIV: 0.25

## 2023-07-07 ASSESSMENT — CONFRONTATIONAL VISUAL FIELD TEST (CVF)
OS_FINDINGS: FULL
OD_FINDINGS: FULL

## 2023-07-07 ASSESSMENT — REFRACTION_AUTOREFRACTION
OS_AXIS: 006
OS_CYLINDER: -1.50
OS_SPHERE: +1.00
OD_CYLINDER: -2.00
OD_AXIS: 177
OD_SPHERE: +0.75

## 2023-07-07 ASSESSMENT — KERATOMETRY
OS_AXISANGLE_DEGREES: 092
OS_K2POWER_DIOPTERS: 46.00
OD_AXISANGLE_DEGREES: 094
OS_K1POWER_DIOPTERS: 44.00
OD_K1POWER_DIOPTERS: 43.75
OD_K2POWER_DIOPTERS: 46.00

## 2023-07-07 ASSESSMENT — TONOMETRY
OD_IOP_MMHG: 14
OS_IOP_MMHG: 16

## 2023-07-07 ASSESSMENT — AXIALLENGTH_DERIVED
OS_AL: 22.9609
OD_AL: 23.1912

## 2023-07-07 ASSESSMENT — VISUAL ACUITY
OS_BCVA: 20/25
OD_BCVA: 20/20-1

## 2023-07-07 ASSESSMENT — SUPERFICIAL PUNCTATE KERATITIS (SPK)
OD_SPK: 1+
OS_SPK: 1+

## 2024-03-20 ENCOUNTER — APPOINTMENT (OUTPATIENT)
Dept: DERMATOLOGY | Facility: CLINIC | Age: 34
End: 2024-03-20
Payer: COMMERCIAL

## 2024-03-20 PROCEDURE — 99203 OFFICE O/P NEW LOW 30 MIN: CPT

## 2024-04-15 ENCOUNTER — APPOINTMENT (OUTPATIENT)
Dept: DERMATOLOGY | Facility: CLINIC | Age: 34
End: 2024-04-15
Payer: COMMERCIAL

## 2024-04-15 PROCEDURE — 99212 OFFICE O/P EST SF 10 MIN: CPT | Mod: 25

## 2024-04-15 PROCEDURE — 11102 TANGNTL BX SKIN SINGLE LES: CPT

## 2024-04-26 ENCOUNTER — APPOINTMENT (OUTPATIENT)
Dept: MAMMOGRAPHY | Facility: CLINIC | Age: 34
End: 2024-04-26
Payer: COMMERCIAL

## 2024-04-26 ENCOUNTER — OUTPATIENT (OUTPATIENT)
Dept: OUTPATIENT SERVICES | Facility: HOSPITAL | Age: 34
LOS: 1 days | End: 2024-04-26
Payer: COMMERCIAL

## 2024-04-26 ENCOUNTER — APPOINTMENT (OUTPATIENT)
Dept: ULTRASOUND IMAGING | Facility: CLINIC | Age: 34
End: 2024-04-26
Payer: COMMERCIAL

## 2024-04-26 DIAGNOSIS — Z98.891 HISTORY OF UTERINE SCAR FROM PREVIOUS SURGERY: Chronic | ICD-10-CM

## 2024-04-26 DIAGNOSIS — Z90.711 ACQUIRED ABSENCE OF UTERUS WITH REMAINING CERVICAL STUMP: Chronic | ICD-10-CM

## 2024-04-26 DIAGNOSIS — R92.8 OTHER ABNORMAL AND INCONCLUSIVE FINDINGS ON DIAGNOSTIC IMAGING OF BREAST: ICD-10-CM

## 2024-04-26 PROCEDURE — 77063 BREAST TOMOSYNTHESIS BI: CPT | Mod: 26

## 2024-04-26 PROCEDURE — 77067 SCR MAMMO BI INCL CAD: CPT | Mod: 26

## 2024-04-26 PROCEDURE — 77067 SCR MAMMO BI INCL CAD: CPT

## 2024-04-26 PROCEDURE — 76641 ULTRASOUND BREAST COMPLETE: CPT | Mod: 26,50

## 2024-04-26 PROCEDURE — 77063 BREAST TOMOSYNTHESIS BI: CPT

## 2024-04-26 PROCEDURE — 76641 ULTRASOUND BREAST COMPLETE: CPT

## 2024-05-13 ENCOUNTER — NON-APPOINTMENT (OUTPATIENT)
Age: 34
End: 2024-05-13

## 2024-06-07 ENCOUNTER — APPOINTMENT (OUTPATIENT)
Dept: NEUROLOGY | Facility: CLINIC | Age: 34
End: 2024-06-07

## 2025-05-21 ENCOUNTER — APPOINTMENT (OUTPATIENT)
Dept: ULTRASOUND IMAGING | Facility: CLINIC | Age: 35
End: 2025-05-21
Payer: MEDICAID

## 2025-05-21 ENCOUNTER — APPOINTMENT (OUTPATIENT)
Dept: MAMMOGRAPHY | Facility: CLINIC | Age: 35
End: 2025-05-21
Payer: MEDICAID

## 2025-05-21 ENCOUNTER — OUTPATIENT (OUTPATIENT)
Dept: OUTPATIENT SERVICES | Facility: HOSPITAL | Age: 35
LOS: 1 days | End: 2025-05-21
Payer: MEDICAID

## 2025-05-21 DIAGNOSIS — Z98.891 HISTORY OF UTERINE SCAR FROM PREVIOUS SURGERY: Chronic | ICD-10-CM

## 2025-05-21 DIAGNOSIS — Z90.711 ACQUIRED ABSENCE OF UTERUS WITH REMAINING CERVICAL STUMP: Chronic | ICD-10-CM

## 2025-05-21 DIAGNOSIS — Z12.39 ENCOUNTER FOR OTHER SCREENING FOR MALIGNANT NEOPLASM OF BREAST: ICD-10-CM

## 2025-05-21 PROCEDURE — 77067 SCR MAMMO BI INCL CAD: CPT | Mod: 26

## 2025-05-21 PROCEDURE — 76641 ULTRASOUND BREAST COMPLETE: CPT

## 2025-05-21 PROCEDURE — 77063 BREAST TOMOSYNTHESIS BI: CPT

## 2025-05-21 PROCEDURE — 77067 SCR MAMMO BI INCL CAD: CPT

## 2025-05-21 PROCEDURE — 76641 ULTRASOUND BREAST COMPLETE: CPT | Mod: 26,50

## 2025-05-21 PROCEDURE — 77063 BREAST TOMOSYNTHESIS BI: CPT | Mod: 26
